# Patient Record
Sex: FEMALE | Race: WHITE | NOT HISPANIC OR LATINO | Employment: STUDENT | URBAN - METROPOLITAN AREA
[De-identification: names, ages, dates, MRNs, and addresses within clinical notes are randomized per-mention and may not be internally consistent; named-entity substitution may affect disease eponyms.]

---
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
## 2024-11-26 ENCOUNTER — APPOINTMENT (OUTPATIENT)
Dept: RADIOLOGY | Facility: CLINIC | Age: 19
End: 2024-11-26
Payer: COMMERCIAL

## 2024-11-26 ENCOUNTER — OFFICE VISIT (OUTPATIENT)
Dept: OBGYN CLINIC | Facility: CLINIC | Age: 19
End: 2024-11-26
Payer: COMMERCIAL

## 2024-11-26 VITALS
SYSTOLIC BLOOD PRESSURE: 121 MMHG | HEIGHT: 64 IN | DIASTOLIC BLOOD PRESSURE: 79 MMHG | BODY MASS INDEX: 22.71 KG/M2 | WEIGHT: 133 LBS | HEART RATE: 103 BPM

## 2024-11-26 DIAGNOSIS — M22.2X2 PATELLOFEMORAL SYNDROME, LEFT: Primary | ICD-10-CM

## 2024-11-26 DIAGNOSIS — M25.562 LEFT KNEE PAIN, UNSPECIFIED CHRONICITY: ICD-10-CM

## 2024-11-26 DIAGNOSIS — M25.561 RIGHT KNEE PAIN, UNSPECIFIED CHRONICITY: ICD-10-CM

## 2024-11-26 DIAGNOSIS — M22.2X1 PATELLOFEMORAL SYNDROME, RIGHT: ICD-10-CM

## 2024-11-26 PROCEDURE — 73562 X-RAY EXAM OF KNEE 3: CPT

## 2024-11-26 PROCEDURE — 99203 OFFICE O/P NEW LOW 30 MIN: CPT | Performed by: ORTHOPAEDIC SURGERY

## 2024-11-26 NOTE — PROGRESS NOTES
Assessment/Plan:  1. Patellofemoral syndrome, left  XR knee 3 vw left non injury    Ambulatory referral to Physical Therapy      2. Patellofemoral syndrome, right  XR knee 3 vw right non injury    Ambulatory referral to Physical Therapy          Richa has bilateral knee pain consistent with patellofemoral syndrome.  Her x-rays are within normal limits.  I recommended conservative treatment of formal physical therapy, ice, anti-inflammatories and if pain persist we could consider further treatment options such as bracing or further imaging if necessary.  She was agreeable to this plan.  Follow-up after therapy if pain continues.    Subjective:   Richa Izquierdo is a 19 y.o. female who presents to the office for evaluation for bilateral knee pain.  She is a college student and has been feeling increased discomfort since May of this year.  She denies any injury or trauma.  She feels pain in the anterior aspect of both knees and the right is slightly worse than the left.  The pain seems increased with physical activity and exercise.  She feels like squatting and lunges caused her increased discomfort.  She denies any swelling or bruising.  When she played soccer in high school she did not have any injury and did not feel this type of pain.  She states that the pain increased significantly after a long flight where her knee was bent to [place].       Review of Systems   Constitutional:  Negative for chills, fever and unexpected weight change.   HENT:  Negative for hearing loss, nosebleeds and sore throat.    Eyes:  Negative for pain, redness and visual disturbance.   Respiratory:  Negative for cough, shortness of breath and wheezing.    Cardiovascular:  Negative for chest pain, palpitations and leg swelling.   Gastrointestinal:  Negative for abdominal pain, nausea and vomiting.   Endocrine: Negative for polydipsia and polyuria.   Genitourinary:  Negative for dysuria and hematuria.   Musculoskeletal:         See HPI    Skin:  Negative for rash and wound.   Neurological:  Negative for dizziness, numbness and headaches.   Psychiatric/Behavioral:  Negative for decreased concentration and suicidal ideas. The patient is not nervous/anxious.          Past Medical History:   Diagnosis Date    Ovarian cyst        History reviewed. No pertinent surgical history.    Family History   Problem Relation Age of Onset    Cancer Maternal Grandmother     Colon cancer Paternal Grandfather        Social History     Occupational History    Not on file   Tobacco Use    Smoking status: Never    Smokeless tobacco: Never   Vaping Use    Vaping status: Never Used   Substance and Sexual Activity    Alcohol use: Never    Drug use: Never    Sexual activity: Not on file         Current Outpatient Medications:     drospirenone-ethinyl estradiol (MELANIE) 3-0.02 MG per tablet, , Disp: , Rfl:     omeprazole (PriLOSEC) 20 mg delayed release capsule, Take 1 capsule (20 mg total) by mouth daily as needed (epigastric burning, heartburn, acid reflux), Disp: 90 capsule, Rfl: 1    Allergies   Allergen Reactions    Eggs Or Egg-Derived Products - Food Allergy Other (See Comments)     egg white light allergy       Objective:  Vitals:    11/26/24 1357   BP: 121/79   Pulse: 103            Right Knee Exam     Tenderness   The patient is experiencing tenderness in the patella and medial retinaculum.    Range of Motion   Extension:  normal   Flexion:  normal     Tests   Ceasar:  Medial - negative Lateral - negative  Varus: negative Valgus: negative    Other   Erythema: absent  Sensation: normal  Pulse: present  Swelling: none  Effusion: no effusion present      Left Knee Exam     Tenderness   The patient is experiencing tenderness in the patella and medial retinaculum.    Range of Motion   Extension:  normal   Flexion:  normal     Tests   [place]easar:  Medial - negative Lateral - negative  Varus: negative Valgus: negative    Other   Erythema: absent  Sensation: normal  Pulse:  present  Swelling: none  Effusion: no effusion present          Observations   Left Knee   Negative for effusion.     Right Knee   Negative for effusion.       Physical Exam  Vitals and nursing note reviewed.   Constitutional:       Appearance: Normal appearance. She is well-developed.   HENT:      Head: Normocephalic and atraumatic.      Right Ear: External ear normal.      Left Ear: External ear normal.   Eyes:      General: No scleral icterus.     Extraocular Movements: Extraocular movements intact.      Conjunctiva/sclera: Conjunctivae normal.   Cardiovascular:      Rate and Rhythm: Normal rate.   Pulmonary:      Effort: Pulmonary effort is normal. No respiratory distress.   Musculoskeletal:      Cervical back: Normal range of motion and neck supple.      Right knee: No effusion.      Instability Tests: Medial Ceasar test negative and lateral Ceasar test negative.      Left knee: No effusion.      Instability Tests: Medial Ceasar test negative and lateral Ceasar test negative.      Comments: See Ortho exam   Skin:     General: Skin is warm and dry.   Neurological:      General: No focal deficit present.      Mental Status: She is alert and oriented to person, place, and time.   Psychiatric:         Behavior: Behavior normal.         I have personally reviewed pertinent films in PACS and my interpretation is as follows:  Bilateral knee x-rays demonstrate no evidence of acute fracture.  No significant degenerative changes.      This document was created using speech voice recognition software.   Grammatical errors, random word insertions, pronoun errors, and incomplete sentences are an occasional consequence of this system due to software limitations, ambient noise, and hardware issues.   Any formal questions or concerns about content, text, or information contained within the body of this dictation should be directly addressed to the provider for clarification.